# Patient Record
(demographics unavailable — no encounter records)

---

## 2024-11-29 NOTE — HISTORY OF PRESENT ILLNESS
[FreeTextEntry1] : I have had the opportunity to see your patient, GEE GANN, in follow up.   Identification: 16 year girl   Diagnosis(es): Marfan syndrome. Dural ectasia. Chronic migraine.  Interval history: She has been doing much better in terms of control of migraines on galcanezumab. She has been "out" for 2 months. HA's were occurring monthly but now occur about every 2 weeks. Rimegapant is an effective abortive agent. Triggers include reclining in a prone position. No other postural component is noted. No school absenteeism. No academic concerns. Depressed mood and excessive anxiety were denied.

## 2024-11-29 NOTE — PHYSICAL EXAM
[Well-appearing] : well-appearing [Normocephalic] : normocephalic [No ocular abnormalities] : no ocular abnormalities [No abnormal neurocutaneous stigmata or skin lesions] : no abnormal neurocutaneous stigmata or skin lesions [Straight] : straight [No deformities] : no deformities [Alert] : alert [Normal speech and language] : normal speech and language [VFF] : VFF [Pupils reactive to light and accommodation] : pupils reactive to light and accommodation [Full extraocular movements] : full extraocular movements [No nystagmus] : no nystagmus [No papilledema] : no papilledema [No facial asymmetry or weakness] : no facial asymmetry or weakness [Gross hearing intact] : gross hearing intact [Equal palate elevation] : equal palate elevation [Good shoulder shrug] : good shoulder shrug [Normal tongue movement] : normal tongue movement [Normal axial and appendicular muscle tone] : normal axial and appendicular muscle tone [Gets up on table without difficulty] : gets up on table without difficulty [No pronator drift] : no pronator drift [Normal finger tapping and fine finger movements] : normal finger tapping and fine finger movements [No abnormal involuntary movements] : no abnormal involuntary movements [5/5 strength in proximal and distal muscles of arms and legs] : 5/5 strength in proximal and distal muscles of arms and legs [2+ biceps] : 2+ biceps [Triceps] : triceps [Knee jerks] : knee jerks [Ankle jerks] : ankle jerks [No ankle clonus] : no ankle clonus [Localizes LT and temperature] : localizes LT and temperature [de-identified] : respirations appear regular and unlabored  [de-identified] : abdomen does not appear distended  [de-identified] : Mild ptosis noted bilaterally. No fatigability.  [de-identified] : No fatigability [de-identified] : casual gait was narrow based. Heel and toe walking were intact. Tandem gait was intact.  [de-identified] : finger to nose and heel-knee-shin movements were intact. Fast finger movements were brisk, rhythmic and symmetric.

## 2024-11-29 NOTE — ASSESSMENT
[FreeTextEntry1] : Improved HA control on current treatment. It is important to continue galcanezumab.

## 2025-04-03 NOTE — PHYSICAL EXAM
[General Appearance - Alert] : alert [General Appearance - In No Acute Distress] : in no acute distress [General Appearance - Well Nourished] : well nourished [General Appearance - Well Developed] : well developed [General Appearance - Well-Appearing] : well appearing [Marfan Syndrome] : Marfan Syndrome [Sclera] : the conjunctiva were normal [Outer Ear] : the ears and nose were normal in appearance [Examination Of The Oral Cavity] : mucous membranes were moist and pink [Respiration, Rhythm And Depth] : normal respiratory rhythm and effort [Auscultation Breath Sounds / Voice Sounds] : breath sounds clear to auscultation bilaterally [No Cough] : no cough [Chest Palpation Tender Sternum] : no chest wall tenderness [Heart Rate And Rhythm] : normal heart rate and rhythm [Heart Sounds] : normal S1 and S2 [No Murmur] : no murmurs  [Heart Sounds Gallop] : no gallops [Heart Sounds Pericardial Friction Rub] : no pericardial rub [Arterial Pulses] : normal upper and lower extremity pulses with no pulse delay [Edema] : no edema [Capillary Refill Test] : normal capillary refill [Midsystolic] : midsystolic [Apical] : was heard at the apex [No Diastolic Murmur] : no diastolic murmur was heard [Abdomen Soft] : soft [Nail Clubbing] : no clubbing  or cyanosis of the fingernails [Moderate] : moderate [Severe] : severe [Delayed Developmental Milestones] : normal neurologic development for age [Abnormal Walk] : normal gait [] : no rash [Demonstrated Behavior - Infant Nonreactive To Parents] : interactive [Mood] : mood and affect were appropriate for age [Demonstrated Behavior] : normal behavior [FreeTextEntry2] : + Striae + Mitral valve prolapse s/p surgical repair of a pectus excavatum [FreeTextEntry1] : striae on posterior thorax

## 2025-04-03 NOTE — CONSULT LETTER
[Today's Date] : [unfilled] [Name] : Name: [unfilled] [] : : ~~ [Today's Date:] : [unfilled] [Dear  ___:] : Dear Dr. [unfilled]: [Consult] : I had the pleasure of evaluating your patient, [unfilled]. My full evaluation follows. [Consult - Single Provider] : Thank you very much for allowing me to participate in the care of this patient. If you have any questions, please do not hesitate to contact me. [Sincerely,] : Sincerely, [FreeTextEntry4] : Jah Fairbanks MD [FreeTextEntry5] : [address] [FreeTextEntry6] : [place] [address] [de-identified] : Shaina Sellers MD\par  Pediatric Cardiologist\par  [facility], [facility]\par  [address] [place] [address]\par  [place], [place] [address]\par  Phone: [phone]\par  FAX: [phone]\par

## 2025-04-03 NOTE — DISCUSSION/SUMMARY
[PE + No Varsity Sports or Strenuous Activity] : [unfilled] may participate in the physical education program, WITH RESTRICTION from all varsity sports and from excessively stressful activities such as rope climbing, weight lifting, sustained running (i.e. laps) and fitness testing. Must be allowed to rest when tired. [Influenza vaccine is recommended] : Influenza vaccine is recommended [Needs SBE Prophylaxis] : [unfilled] does not need bacterial endocarditis prophylaxis [FreeTextEntry1] : In summary, Mounika's aortic root dimension z-score has improved over the past 6 years.  Her aortic root dimension today is 3.63 cm, z-score = 3.0 (previous z-score in June of 2018 = 4.0) - and remains consistent with mild dilation. Her sinotubular junction remains dilated/effaced. She has mitral and tricuspid valve prolapse; the mitral valve appears to be working quite well with only trivial to mild insufficiency by echocardiogram. There is mild tricuspid regurgitation. There is no aortic insufficiency.  She has a normal LV ejection fraction of 60% (echocardiogram), and the left ventricular volumes by the 5/6*A*L method were within normal limits. She was normotensive on today's evaluation.  Mounika last cardiac MRI/MRA on June 28, 2023. The aortic root dimension was stable at 3.5 cm. No other aortic aneurysms were identified. These measurements correlated well with the echocardiographic measurements. I have recommended that Mounika have a follow-up cardiac MRI/MRA in the late summer 2025. . The purpose of this study would be to obtain accurate imaging of the entire aortic tree, including the ascending, thoracic and abdominal aorta, to rule out aneurysms in sites other than the aortic root. It will, of course, also provide excellent imaging of the aortic root, and therefore accurate dimensions, which can be used to correlate with measurements obtained by echocardiogram. Cardiac MRI/MRA provides excellent supplemental imaging to echocardiograms in surveilling patients with Marfan syndrome.  Mounika's EKG today revealed a normal sinus rhythm. Patients with connective tissue disorders and mitral valve prolapse may develop concerning arrhythmias.  Her most recent Holter monitor performed in May 2022 revealed no concerning arrhythmias.  She had a predominant normal sinus rhythm, with normal heart rate variability, and occasional to frequent premature atrial contractions with no sustained tachyarrhythmias.  Today, she reports no palpitations or irregular heartbeats.  A 24-hour ZIO monitor was placed today and is currently pending.  She is currently tolerating her dose of losartan 50 mg twice daily. The maximum recommended dose of Losartan is 100 mg/day.   I remain very pleased with Mounika's overall improved nutrition.  Time was spent discussing with Mounika and her mother, discussing the importance of adequate nutrition and hydration throughout the course of the day. I have also emphasized the importance of excellent dental hygiene with biannual visits to the dentist for prophylactic cleanings. She should continue to be followed in ophthalmology annually, General Surgery (pectus), orthopedics (joint laxity, foot deformities, subluxation of her knee, scoliosis), neurology (chronic migraine headaches, persistent dizziness), psychiatry for her depression/anxiety, and GI for her constipation and abdominal pain.  We discussed at length, exercise recommendations for Mounika.  Mounika may participate in aerobic activities such as jogging, bike riding, running track, swimming.  She should avoid excessive isometric exercises; however, lifting low weights with repetitions for muscle toning would be permissible.  She is cleared to participate on the school track team, if she would like.  She should be seen in pediatric cardiology followup in approximately 6 months time, or sooner if clinically indicated. I hope you find this information helpful to you.   **An activity recommendation form was provided to Mounika and her mother today.   Total time spent today included reviewing diagnosis and treatment plan/monitoring, review of prior notes, review of medications and monitoring for side effects, review of last labs with patient/family, plan for continued symptom and laboratory monitoring as ordered, and time spent with patient/parent. Reviewed recent chart notes from other providers and medical records as well. This excludes time spent on procedures.

## 2025-04-03 NOTE — CARDIOLOGY SUMMARY
[LVSF ___%] : LV Shortening Fraction [unfilled]% [Today's Date] : [unfilled] [FreeTextEntry1] : An electrocardiogram today shows a normal sinus rhythm at a rate of 75 bpm.  There was a rightward axis and a RV conduction delay. The measured intervals were normal. There was no ectopy seen on the surface electrocardiogram.  The ST-T wave segments were normal.  [FreeTextEntry2] : Summary: 1. Marfan syndrome. 2. Severe pectus excavatum - s/p surgical repair; surgical bars have been removed. 3. Mild tricuspid valve prolapse. 4. Myxomatous mitral valve, mild mitral valve prolapse and accessory mitral valve tissue is noted. 5. Trivial mitral valve regurgitation. 6. Mildly dilated aortic root. 7. No evidence of aortic valve regurgitation. 8. Aortic sinuses of Valsalva dimension (systole) = 3.63 cm (z = 2.98). 9. There is mildly asymmetric aortic root, the posterior, non-coronary cusp being more prominent. The aortic root in short axis (PSAX) in systole measures (3.43 X 3.47 X 3.45 cm) in dimension. The ascending aorta in cross section (PSAX) at the level of the right pulmonary artery measures: 2.5 cm. The sino-tubular junction is effaced. The thoracic descending and proximal abdominal aorta appear normal in contour and caliber. 10. Normal ascending aorta. 11. Ascending aorta dimension (systole) = 2.50 cm (z = 0.02). 12. No evidence of pulmonary hypertension. 13. Pulmonary artery pressure estimate is based on interventricular septal systolic configuration. 14. Normal left ventricular size, morphology and systolic function. 15. Left ventricular ejection fraction by 5/6 Area x Length is normal at 60 %. 16. The LV volumes by the 5/6*A*L method were within normal limits. 17. Normal left ventricular diastolic function. 18. Normal right ventricular morphology with qualitatively normal size and systolic function. 19. No pericardial effusion. [de-identified] : ZIO - 24 hours - pending  May 16, 2022: This 24 hour Holter monitor revealed a NSR at a rate of 46 - 171 bpm, with an average heart rate of 86 bpm.  Frequent isolated premature atrial contractions were noted. Rare isolated premature ventricular contractions were seen.  July 19, 2021: This 24 hour Holter monitor revealed a NSR at a rate of 42 - 183 bpm, with an average heart rate of 78 bpm.  Occasional isolated premature atrial contractions were noted. Rare isolated premature ventricular contractions were seen.  6/13/2018: This 24 hour Holter monitor revealed a NSR at a rate of 42 - 156 bpm, with an average heart rate of 70 bpm. Rare premature atrial contractions were noted. Rare late cycle, unifocal premature ventricular contractions were seen. [de-identified] : Order: Cardiac MRI/MRA for late summer of 2025  June 28, 2023: The systolic aortic root dimension (LV outflow) measured 3.56 cm. The aortic dimension in cross section was 3.45 cm x 3.54 cm x 3.55 cm. Taking the maximal dimension of 3.55 cm, the z-score was 3.24.  The ascending aorta measured 2.3 cm in cross section, Z score of -0.36.  The ascending, transverse and descending aorta appeared normal with no evidence of aneurysmal dilation. Mitral and tricuspid valve prolapse with trivial mitral and tricuspid regurgitation was noted.  The right pulmonary artery has a longer course than the left pulmonary artery.  There was no evidence of branch pulmonary artery stenosis.  The left ventricular ejection fraction was low normal at 58.5%.   August 29, 2019: This study was notable for a severe pectus excavatum and scoliosis. The systolic aortic root dimension (LV outflow) measured 2.97 cm. The aortic dimension in cross section was 2.75 cm x 2.84 cm x 3.02 cm. Taking the maximal dimension of 3.02 cm, the z-score was 3.81. The ascending, transverse and descending aorta appeared normal with no evidence of aneurysmal dilation. Mitral and tricuspid valve prolapse with trivial mitral and tricuspid regurgitation was noted. Severe pectus excavatum, especially inferiorly, resulting in the complete shift of the heart into the left thorax. As a result, the right pulmonary artery is stretched with a longer course. The left pulmonary artery has a short course. There is no branch pulmonary artery stenosis. The left ventricular ejection fraction was low normal at 54%.   August 13, 2015: This study was notable for a pectus excavatum and scoliosis. The systolic aortic root dimension (LV outflow) measured 2.35 cm. The aortic dimension in cross section was 2.54 cm x 2.4 cm x 2.43 cm. Taking the maximal dimension of 2.54 cm, the z-score was 2.85. The ascending, transverse and descending aorta appeared normal with no evidence of aneurysmal dilation. Mitral valve prolapse with trivial mitral regurgitation was noted. The pulmonary arteries were normal in size. The course of the right and left coronary arteries, as well as their origins, was normal. The left ventricular ejection fraction was normal at 59%. 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 Pathogenic mutation for Marfan Syndrome.

## 2025-04-03 NOTE — CARDIOLOGY SUMMARY
[LVSF ___%] : LV Shortening Fraction [unfilled]% [Today's Date] : [unfilled] [FreeTextEntry1] : An electrocardiogram today shows a normal sinus rhythm at a rate of 75 bpm.  There was a rightward axis and a RV conduction delay. The measured intervals were normal. There was no ectopy seen on the surface electrocardiogram.  The ST-T wave segments were normal.  [FreeTextEntry2] : Summary: 1. Marfan syndrome. 2. Severe pectus excavatum - s/p surgical repair; surgical bars have been removed. 3. Mild tricuspid valve prolapse. 4. Myxomatous mitral valve, mild mitral valve prolapse and accessory mitral valve tissue is noted. 5. Trivial mitral valve regurgitation. 6. Mildly dilated aortic root. 7. No evidence of aortic valve regurgitation. 8. Aortic sinuses of Valsalva dimension (systole) = 3.63 cm (z = 2.98). 9. There is mildly asymmetric aortic root, the posterior, non-coronary cusp being more prominent. The aortic root in short axis (PSAX) in systole measures (3.43 X 3.47 X 3.45 cm) in dimension. The ascending aorta in cross section (PSAX) at the level of the right pulmonary artery measures: 2.5 cm. The sino-tubular junction is effaced. The thoracic descending and proximal abdominal aorta appear normal in contour and caliber. 10. Normal ascending aorta. 11. Ascending aorta dimension (systole) = 2.50 cm (z = 0.02). 12. No evidence of pulmonary hypertension. 13. Pulmonary artery pressure estimate is based on interventricular septal systolic configuration. 14. Normal left ventricular size, morphology and systolic function. 15. Left ventricular ejection fraction by 5/6 Area x Length is normal at 60 %. 16. The LV volumes by the 5/6*A*L method were within normal limits. 17. Normal left ventricular diastolic function. 18. Normal right ventricular morphology with qualitatively normal size and systolic function. 19. No pericardial effusion. [de-identified] : ZIO - 24 hours - pending  May 16, 2022: This 24 hour Holter monitor revealed a NSR at a rate of 46 - 171 bpm, with an average heart rate of 86 bpm.  Frequent isolated premature atrial contractions were noted. Rare isolated premature ventricular contractions were seen.  July 19, 2021: This 24 hour Holter monitor revealed a NSR at a rate of 42 - 183 bpm, with an average heart rate of 78 bpm.  Occasional isolated premature atrial contractions were noted. Rare isolated premature ventricular contractions were seen.  6/13/2018: This 24 hour Holter monitor revealed a NSR at a rate of 42 - 156 bpm, with an average heart rate of 70 bpm. Rare premature atrial contractions were noted. Rare late cycle, unifocal premature ventricular contractions were seen. [de-identified] : Order: Cardiac MRI/MRA for late summer of 2025  June 28, 2023: The systolic aortic root dimension (LV outflow) measured 3.56 cm. The aortic dimension in cross section was 3.45 cm x 3.54 cm x 3.55 cm. Taking the maximal dimension of 3.55 cm, the z-score was 3.24.  The ascending aorta measured 2.3 cm in cross section, Z score of -0.36.  The ascending, transverse and descending aorta appeared normal with no evidence of aneurysmal dilation. Mitral and tricuspid valve prolapse with trivial mitral and tricuspid regurgitation was noted.  The right pulmonary artery has a longer course than the left pulmonary artery.  There was no evidence of branch pulmonary artery stenosis.  The left ventricular ejection fraction was low normal at 58.5%.   August 29, 2019: This study was notable for a severe pectus excavatum and scoliosis. The systolic aortic root dimension (LV outflow) measured 2.97 cm. The aortic dimension in cross section was 2.75 cm x 2.84 cm x 3.02 cm. Taking the maximal dimension of 3.02 cm, the z-score was 3.81. The ascending, transverse and descending aorta appeared normal with no evidence of aneurysmal dilation. Mitral and tricuspid valve prolapse with trivial mitral and tricuspid regurgitation was noted. Severe pectus excavatum, especially inferiorly, resulting in the complete shift of the heart into the left thorax. As a result, the right pulmonary artery is stretched with a longer course. The left pulmonary artery has a short course. There is no branch pulmonary artery stenosis. The left ventricular ejection fraction was low normal at 54%.   August 13, 2015: This study was notable for a pectus excavatum and scoliosis. The systolic aortic root dimension (LV outflow) measured 2.35 cm. The aortic dimension in cross section was 2.54 cm x 2.4 cm x 2.43 cm. Taking the maximal dimension of 2.54 cm, the z-score was 2.85. The ascending, transverse and descending aorta appeared normal with no evidence of aneurysmal dilation. Mitral valve prolapse with trivial mitral regurgitation was noted. The pulmonary arteries were normal in size. The course of the right and left coronary arteries, as well as their origins, was normal. The left ventricular ejection fraction was normal at 59%. 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 Pathogenic mutation for Marfan Syndrome.

## 2025-04-03 NOTE — CARDIOLOGY SUMMARY
[LVSF ___%] : LV Shortening Fraction [unfilled]% [Today's Date] : [unfilled] [FreeTextEntry1] : An electrocardiogram today shows a normal sinus rhythm at a rate of 75 bpm.  There was a rightward axis and a RV conduction delay. The measured intervals were normal. There was no ectopy seen on the surface electrocardiogram.  The ST-T wave segments were normal.  [FreeTextEntry2] : Summary: 1. Marfan syndrome. 2. Severe pectus excavatum - s/p surgical repair; surgical bars have been removed. 3. Mild tricuspid valve prolapse. 4. Myxomatous mitral valve, mild mitral valve prolapse and accessory mitral valve tissue is noted. 5. Trivial mitral valve regurgitation. 6. Mildly dilated aortic root. 7. No evidence of aortic valve regurgitation. 8. Aortic sinuses of Valsalva dimension (systole) = 3.63 cm (z = 2.98). 9. There is mildly asymmetric aortic root, the posterior, non-coronary cusp being more prominent. The aortic root in short axis (PSAX) in systole measures (3.43 X 3.47 X 3.45 cm) in dimension. The ascending aorta in cross section (PSAX) at the level of the right pulmonary artery measures: 2.5 cm. The sino-tubular junction is effaced. The thoracic descending and proximal abdominal aorta appear normal in contour and caliber. 10. Normal ascending aorta. 11. Ascending aorta dimension (systole) = 2.50 cm (z = 0.02). 12. No evidence of pulmonary hypertension. 13. Pulmonary artery pressure estimate is based on interventricular septal systolic configuration. 14. Normal left ventricular size, morphology and systolic function. 15. Left ventricular ejection fraction by 5/6 Area x Length is normal at 60 %. 16. The LV volumes by the 5/6*A*L method were within normal limits. 17. Normal left ventricular diastolic function. 18. Normal right ventricular morphology with qualitatively normal size and systolic function. 19. No pericardial effusion. [de-identified] : ZIO - 24 hours - pending  May 16, 2022: This 24 hour Holter monitor revealed a NSR at a rate of 46 - 171 bpm, with an average heart rate of 86 bpm.  Frequent isolated premature atrial contractions were noted. Rare isolated premature ventricular contractions were seen.  July 19, 2021: This 24 hour Holter monitor revealed a NSR at a rate of 42 - 183 bpm, with an average heart rate of 78 bpm.  Occasional isolated premature atrial contractions were noted. Rare isolated premature ventricular contractions were seen.  6/13/2018: This 24 hour Holter monitor revealed a NSR at a rate of 42 - 156 bpm, with an average heart rate of 70 bpm. Rare premature atrial contractions were noted. Rare late cycle, unifocal premature ventricular contractions were seen. [de-identified] : Order: Cardiac MRI/MRA for late summer of 2025  June 28, 2023: The systolic aortic root dimension (LV outflow) measured 3.56 cm. The aortic dimension in cross section was 3.45 cm x 3.54 cm x 3.55 cm. Taking the maximal dimension of 3.55 cm, the z-score was 3.24.  The ascending aorta measured 2.3 cm in cross section, Z score of -0.36.  The ascending, transverse and descending aorta appeared normal with no evidence of aneurysmal dilation. Mitral and tricuspid valve prolapse with trivial mitral and tricuspid regurgitation was noted.  The right pulmonary artery has a longer course than the left pulmonary artery.  There was no evidence of branch pulmonary artery stenosis.  The left ventricular ejection fraction was low normal at 58.5%.   August 29, 2019: This study was notable for a severe pectus excavatum and scoliosis. The systolic aortic root dimension (LV outflow) measured 2.97 cm. The aortic dimension in cross section was 2.75 cm x 2.84 cm x 3.02 cm. Taking the maximal dimension of 3.02 cm, the z-score was 3.81. The ascending, transverse and descending aorta appeared normal with no evidence of aneurysmal dilation. Mitral and tricuspid valve prolapse with trivial mitral and tricuspid regurgitation was noted. Severe pectus excavatum, especially inferiorly, resulting in the complete shift of the heart into the left thorax. As a result, the right pulmonary artery is stretched with a longer course. The left pulmonary artery has a short course. There is no branch pulmonary artery stenosis. The left ventricular ejection fraction was low normal at 54%.   August 13, 2015: This study was notable for a pectus excavatum and scoliosis. The systolic aortic root dimension (LV outflow) measured 2.35 cm. The aortic dimension in cross section was 2.54 cm x 2.4 cm x 2.43 cm. Taking the maximal dimension of 2.54 cm, the z-score was 2.85. The ascending, transverse and descending aorta appeared normal with no evidence of aneurysmal dilation. Mitral valve prolapse with trivial mitral regurgitation was noted. The pulmonary arteries were normal in size. The course of the right and left coronary arteries, as well as their origins, was normal. The left ventricular ejection fraction was normal at 59%. 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 Pathogenic mutation for Marfan Syndrome.

## 2025-04-03 NOTE — CARDIOLOGY SUMMARY
[LVSF ___%] : LV Shortening Fraction [unfilled]% [Today's Date] : [unfilled] [FreeTextEntry1] : An electrocardiogram today shows a normal sinus rhythm at a rate of 75 bpm.  There was a rightward axis and a RV conduction delay. The measured intervals were normal. There was no ectopy seen on the surface electrocardiogram.  The ST-T wave segments were normal.  [FreeTextEntry2] : Summary: 1. Marfan syndrome. 2. Severe pectus excavatum - s/p surgical repair; surgical bars have been removed. 3. Mild tricuspid valve prolapse. 4. Myxomatous mitral valve, mild mitral valve prolapse and accessory mitral valve tissue is noted. 5. Trivial mitral valve regurgitation. 6. Mildly dilated aortic root. 7. No evidence of aortic valve regurgitation. 8. Aortic sinuses of Valsalva dimension (systole) = 3.63 cm (z = 2.98). 9. There is mildly asymmetric aortic root, the posterior, non-coronary cusp being more prominent. The aortic root in short axis (PSAX) in systole measures (3.43 X 3.47 X 3.45 cm) in dimension. The ascending aorta in cross section (PSAX) at the level of the right pulmonary artery measures: 2.5 cm. The sino-tubular junction is effaced. The thoracic descending and proximal abdominal aorta appear normal in contour and caliber. 10. Normal ascending aorta. 11. Ascending aorta dimension (systole) = 2.50 cm (z = 0.02). 12. No evidence of pulmonary hypertension. 13. Pulmonary artery pressure estimate is based on interventricular septal systolic configuration. 14. Normal left ventricular size, morphology and systolic function. 15. Left ventricular ejection fraction by 5/6 Area x Length is normal at 60 %. 16. The LV volumes by the 5/6*A*L method were within normal limits. 17. Normal left ventricular diastolic function. 18. Normal right ventricular morphology with qualitatively normal size and systolic function. 19. No pericardial effusion. [de-identified] : ZIO - 24 hours - pending  May 16, 2022: This 24 hour Holter monitor revealed a NSR at a rate of 46 - 171 bpm, with an average heart rate of 86 bpm.  Frequent isolated premature atrial contractions were noted. Rare isolated premature ventricular contractions were seen.  July 19, 2021: This 24 hour Holter monitor revealed a NSR at a rate of 42 - 183 bpm, with an average heart rate of 78 bpm.  Occasional isolated premature atrial contractions were noted. Rare isolated premature ventricular contractions were seen.  6/13/2018: This 24 hour Holter monitor revealed a NSR at a rate of 42 - 156 bpm, with an average heart rate of 70 bpm. Rare premature atrial contractions were noted. Rare late cycle, unifocal premature ventricular contractions were seen. [de-identified] : Order: Cardiac MRI/MRA for late summer of 2025  June 28, 2023: The systolic aortic root dimension (LV outflow) measured 3.56 cm. The aortic dimension in cross section was 3.45 cm x 3.54 cm x 3.55 cm. Taking the maximal dimension of 3.55 cm, the z-score was 3.24.  The ascending aorta measured 2.3 cm in cross section, Z score of -0.36.  The ascending, transverse and descending aorta appeared normal with no evidence of aneurysmal dilation. Mitral and tricuspid valve prolapse with trivial mitral and tricuspid regurgitation was noted.  The right pulmonary artery has a longer course than the left pulmonary artery.  There was no evidence of branch pulmonary artery stenosis.  The left ventricular ejection fraction was low normal at 58.5%.   August 29, 2019: This study was notable for a severe pectus excavatum and scoliosis. The systolic aortic root dimension (LV outflow) measured 2.97 cm. The aortic dimension in cross section was 2.75 cm x 2.84 cm x 3.02 cm. Taking the maximal dimension of 3.02 cm, the z-score was 3.81. The ascending, transverse and descending aorta appeared normal with no evidence of aneurysmal dilation. Mitral and tricuspid valve prolapse with trivial mitral and tricuspid regurgitation was noted. Severe pectus excavatum, especially inferiorly, resulting in the complete shift of the heart into the left thorax. As a result, the right pulmonary artery is stretched with a longer course. The left pulmonary artery has a short course. There is no branch pulmonary artery stenosis. The left ventricular ejection fraction was low normal at 54%.   August 13, 2015: This study was notable for a pectus excavatum and scoliosis. The systolic aortic root dimension (LV outflow) measured 2.35 cm. The aortic dimension in cross section was 2.54 cm x 2.4 cm x 2.43 cm. Taking the maximal dimension of 2.54 cm, the z-score was 2.85. The ascending, transverse and descending aorta appeared normal with no evidence of aneurysmal dilation. Mitral valve prolapse with trivial mitral regurgitation was noted. The pulmonary arteries were normal in size. The course of the right and left coronary arteries, as well as their origins, was normal. The left ventricular ejection fraction was normal at 59%. 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 Pathogenic mutation for Marfan Syndrome.

## 2025-04-03 NOTE — HISTORY OF PRESENT ILLNESS
[FreeTextEntry1] : Mounika was evaluated at the cardiology office at the [facility] on April 1, 2025.  She is now a 16 year-10-month-old young lady who is followed in our division with the diagnosis of Marfan syndrome (genetically confirmed), with a dilated aortic root, and mitral valve prolapse. Her last cardiac evaluation was on July 9, 2024. She had a successful surgical repair of her pectus excavatum by Dr. Rincon on December 13, 2019, as well as successful surgical removal of the chest wall bar on August 10, 2022.  She was accompanied to the office visit today by her mother.   Mounika's father has Marfan's syndrome, with a known molecular genetically determined familial mutation for this syndrome, (see below for the mutation). Mounika shares this same genetic familial mutation with her father.   Mounika is asymptomatic with reference to the cardiovascular system.  She reports no chest pain, palpitations, dizziness, easy fatigability, shortness of breath, or syncope. Mounika has been staying active by walking.  In the past, she has also enjoyed participating on the [employer] team.  She participates in a modified gym program at school.  Her last cardiac MRI/MRA was on June 28, 2023.  She is being treated with losartan 50 mg twice daily, for her dilated aorta, and tolerates this medication without any problems.   In early May, 2022,  Mounika had some increased anxiety.  She was going to the school nurse for migraines and chest wall discomfort.  At those times, the nurse recorded a heart rate of 120 bpm.  For these reasons, a 24-hour Holter monitor was performed on May 16, 2022.  This study was a predominant normal sinus rhythm with normal heart rate variability and occasional to frequent premature atrial contractions.  Since that time, Mounika had been working with a psychiatrist and psychologist.  These interventions appeared to be helpful with regard to addressing Mounika's anxieties/depression. She is being treated with Luvox/ fluvoxamine for depression (selective serotonin reuptake inhibitor). Mounika is no longer complaining of palpitations or chest pain.  Mounika is now on a 5-week wean of Luvox (Fluvoxamine) and has been started on Wellbutrin (Bupropion).  She appears well-nourished and her BMI is quite good at the 55%ile (improved from the 5%ile in July of 2020).  In the past, Mounika was evaluated by Dr. Neil in the adolescent medicine eating disorder clinic.  The family has opted not to return to their care.  However, Mounika has shown marked improvement in her eating habits and continues to gain weight progressively and appropriately.  She was last seen in GI by Dr. Nolan in September of 2023.  Mounika has a history of significant headaches.  Mounika is followed by Dr. Borjas, pediatric neurologist, for her headaches. 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 In September 2019, Mounika had a brain MRI performed which was unremarkable. An MRA of her neck was notable for tortuosities of the internal carotid arteries with no aneurysms, or stenoses. The possibility of a "low pressure CSF" headache was considered and Florinef was started. This did not seem to affect the intensity or frequency of the headaches. Her neurologist discontinued therapy with Florinef. After multiple headache medications had failed, Mounika was started on therapy with gabapentin by Dr. Borjas.   Also, Depakote was added to her treatment for her migraine headaches in September 2020.   In the past, she had a short trial of atenolol 12.5 mg once daily for approximately 1 week. However, Mounika complained of dizziness, lightheadedness and visual changes while taking the atenolol.  These medications have been discontinued.  She is currently being treated with Emgality - Galcanezumab (an injection once a month for her migraines), which has been helpful.  She continues to get recurrent migraines and is treated with Nurtec (rimegepant) for break-through headaches. She is followed by Dr. Borjas, in Ped Neuro. Her last evaluation was on November 26, 2024.  While in school, her headaches are often triggered by bright light and screens such as a "smart board".  Mounika now has 504 accommodations in school, such that Mounika gets her learning information on her personal Ipad instead of from the smart board. Mrs. Fallon says that this also has helped decrease her headaches.   I continued to emphasize the importance of excellent hydration throughout the course of the day, as well as salty snacks, as well as adequate sleep hygiene and daily exercise.  Her pectus excavatum was extremely severe, (Ita index of 9.4). She is s/p surgical repair of her pectus excavatum by Dr. Rincon on December 13, 2019, as well as successful surgical removal of the chest wall bar on August 10, 2022.   In the past, she had been followed in orthopedics at [facility] by Dr. Cerda for her scoliosis. In April of 2018, Mounika had a subluxation of her patella, which was followed in pediatric orthopedics by Dr. Escobedo. Mounika was evaluated [place]. Ganrica of [place] [facility] [place].  She has scoliosis (28 degrees), and postural kyphosis.  She is nearing skeletal maturity, and her scoliosis is unlikely to progress significantly. She is in need of orthopedic follow-up.   She is in the 11th grade.  She participates in the [employer] Bac[employer]ate program in her high school, an accelerated honors program.  She has a 504 in place in school.  She is s/p a palate expander and orthodontic braces. She has no known allergies to medications. Her immunizations are up to date.   Her last ophthalmology visit was in the summer of 2023. She has glasses and contact lenses for myopia (-2.75 diopters OD, -3.5 diopters OS).  She has no evidence of ectopia lentis.  Annual follow-up in ophthalmology is recommended.  Her last dental evaluation was in May of 2024.  A review of systems was otherwise unremarkable.  Mounika's father has many medical problems relating to his Marfan syndrome. In December of 2012, Mr. Fallon underwent a valve sparing aortic root replacement with a Valsalva graft, by Dr. Gerardo, at [facility]. He is followed by cardiology at [facility] for his cardiac issues relating to Marfan syndrome.  Recently, in May 2023, Mr. Fallon developed atrial fibrillation requiring an ablation which was performed at [facility].  He also has severe scoliosis (90 degree curve). He had spine surgery in July of 2013 at [facility] for scoliosis. Mr. Fallon was also treated for testicular cancer in the summer of 2014. Mounika's younger brother has been diagnosed with Crohn's disease at [facility].  There has been no other interval family history.

## 2025-04-03 NOTE — CONSULT LETTER
[Today's Date] : [unfilled] [Name] : Name: [unfilled] [] : : ~~ [Today's Date:] : [unfilled] [Dear  ___:] : Dear Dr. [unfilled]: [Consult] : I had the pleasure of evaluating your patient, [unfilled]. My full evaluation follows. [Consult - Single Provider] : Thank you very much for allowing me to participate in the care of this patient. If you have any questions, please do not hesitate to contact me. [Sincerely,] : Sincerely, [FreeTextEntry4] : Jah Fairbanks MD [FreeTextEntry5] : [address] [FreeTextEntry6] : [place] [address] [de-identified] : Shaina Sellers MD\par  Pediatric Cardiologist\par  [facility], [facility]\par  [address] [place] [address]\par  [place], [place] [address]\par  Phone: [phone]\par  FAX: [phone]\par

## 2025-04-03 NOTE — HISTORY OF PRESENT ILLNESS
[FreeTextEntry1] : Mounika was evaluated at the cardiology office at the [facility] on April 1, 2025.  She is now a 16 year-10-month-old young lady who is followed in our division with the diagnosis of Marfan syndrome (genetically confirmed), with a dilated aortic root, and mitral valve prolapse. Her last cardiac evaluation was on July 9, 2024. She had a successful surgical repair of her pectus excavatum by Dr. Rincon on December 13, 2019, as well as successful surgical removal of the chest wall bar on August 10, 2022.  She was accompanied to the office visit today by her mother.   Mounika's father has Marfan's syndrome, with a known molecular genetically determined familial mutation for this syndrome, (see below for the mutation). Mounika shares this same genetic familial mutation with her father.   Mounika is asymptomatic with reference to the cardiovascular system.  She reports no chest pain, palpitations, dizziness, easy fatigability, shortness of breath, or syncope. Mounika has been staying active by walking.  In the past, she has also enjoyed participating on the [employer] team.  She participates in a modified gym program at school.  Her last cardiac MRI/MRA was on June 28, 2023.  She is being treated with losartan 50 mg twice daily, for her dilated aorta, and tolerates this medication without any problems.   In early May, 2022,  Mounika had some increased anxiety.  She was going to the school nurse for migraines and chest wall discomfort.  At those times, the nurse recorded a heart rate of 120 bpm.  For these reasons, a 24-hour Holter monitor was performed on May 16, 2022.  This study was a predominant normal sinus rhythm with normal heart rate variability and occasional to frequent premature atrial contractions.  Since that time, Mounika had been working with a psychiatrist and psychologist.  These interventions appeared to be helpful with regard to addressing Mounika's anxieties/depression. She is being treated with Luvox/ fluvoxamine for depression (selective serotonin reuptake inhibitor). Mounika is no longer complaining of palpitations or chest pain.  Mounika is now on a 5-week wean of Luvox (Fluvoxamine) and has been started on Wellbutrin (Bupropion).  She appears well-nourished and her BMI is quite good at the 55%ile (improved from the 5%ile in July of 2020).  In the past, Mounika was evaluated by Dr. Neil in the adolescent medicine eating disorder clinic.  The family has opted not to return to their care.  However, Mounika has shown marked improvement in her eating habits and continues to gain weight progressively and appropriately.  She was last seen in GI by Dr. Nolan in September of 2023.  Mounika has a history of significant headaches.  Mounika is followed by Dr. Borjas, pediatric neurologist, for her headaches. 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 In September 2019, Mounika had a brain MRI performed which was unremarkable. An MRA of her neck was notable for tortuosities of the internal carotid arteries with no aneurysms, or stenoses. The possibility of a "low pressure CSF" headache was considered and Florinef was started. This did not seem to affect the intensity or frequency of the headaches. Her neurologist discontinued therapy with Florinef. After multiple headache medications had failed, Mounika was started on therapy with gabapentin by Dr. Borjas.   Also, Depakote was added to her treatment for her migraine headaches in September 2020.   In the past, she had a short trial of atenolol 12.5 mg once daily for approximately 1 week. However, Mounika complained of dizziness, lightheadedness and visual changes while taking the atenolol.  These medications have been discontinued.  She is currently being treated with Emgality - Galcanezumab (an injection once a month for her migraines), which has been helpful.  She continues to get recurrent migraines and is treated with Nurtec (rimegepant) for break-through headaches. She is followed by Dr. Borjas, in Ped Neuro. Her last evaluation was on November 26, 2024.  While in school, her headaches are often triggered by bright light and screens such as a "smart board".  Mounika now has 504 accommodations in school, such that Mounika gets her learning information on her personal Ipad instead of from the smart board. Mrs. Fallon says that this also has helped decrease her headaches.   I continued to emphasize the importance of excellent hydration throughout the course of the day, as well as salty snacks, as well as adequate sleep hygiene and daily exercise.  Her pectus excavatum was extremely severe, (Ita index of 9.4). She is s/p surgical repair of her pectus excavatum by Dr. Rincon on December 13, 2019, as well as successful surgical removal of the chest wall bar on August 10, 2022.   In the past, she had been followed in orthopedics at [facility] by Dr. Cerda for her scoliosis. In April of 2018, Mounika had a subluxation of her patella, which was followed in pediatric orthopedics by Dr. Escobedo. Mounika was evaluated by Dr. Garnica of orthopedics at [facility] in November 2022.  She has scoliosis (28 degrees), and postural kyphosis.  She is nearing skeletal maturity, and her scoliosis is unlikely to progress significantly. She is in need of orthopedic follow-up.   She is in the 11th grade.  She participates in the [employer]ate program in her high school, an accelerated honors program.  She has a 504 in place in school.  She is s/p a palate expander and orthodontic braces. She has no known allergies to medications. Her immunizations are up to date.   Her last ophthalmology visit was in the summer of 2023. She has glasses and contact lenses for myopia (-2.75 diopters OD, -3.5 diopters OS).  She has no evidence of ectopia lentis.  Annual follow-up in ophthalmology is recommended.  Her last dental evaluation was in May of 2024.  A review of systems was otherwise unremarkable.  Mounika's father has many medical problems relating to his Marfan syndrome. In December of 2012, Mr. Fallon underwent a valve sparing aortic root replacement with a Valsalva graft, by Dr. Gerardo, at [facility]. He is followed by cardiology at [facility] for his cardiac issues relating to Marfan syndrome.  Recently, in May 2023, Mr. Fallon developed atrial fibrillation requiring an ablation which was performed at [facility].  He also has severe scoliosis (90 degree curve). He had spine surgery in July of 2013 at [facility] for scoliosis. Mr. Fallon was also treated for testicular cancer in the summer of 2014. Mounika's younger brother has been diagnosed with Crohn's disease at [facility].  There has been no other interval family history.

## 2025-04-03 NOTE — CONSULT LETTER
[Today's Date] : [unfilled] [Name] : Name: [unfilled] [] : : ~~ [Today's Date:] : [unfilled] [Dear  ___:] : Dear Dr. [unfilled]: [Consult] : I had the pleasure of evaluating your patient, [unfilled]. My full evaluation follows. [Consult - Single Provider] : Thank you very much for allowing me to participate in the care of this patient. If you have any questions, please do not hesitate to contact me. [Sincerely,] : Sincerely, [FreeTextEntry4] : Jah Fairbanks MD [FreeTextEntry5] : [address] [FreeTextEntry6] : [place] [address] [de-identified] : Shaina Sellers MD\par  Pediatric Cardiologist\par  [facility]\par  [address] [place] [address]\par  [place], [place] [address]\par  Phone: [phone]\par  FAX: [phone]\par

## 2025-04-03 NOTE — CONSULT LETTER
[Today's Date] : [unfilled] [Name] : Name: [unfilled] [] : : ~~ [Today's Date:] : [unfilled] [Dear  ___:] : Dear Dr. [unfilled]: [Consult] : I had the pleasure of evaluating your patient, [unfilled]. My full evaluation follows. [Consult - Single Provider] : Thank you very much for allowing me to participate in the care of this patient. If you have any questions, please do not hesitate to contact me. [Sincerely,] : Sincerely, [FreeTextEntry4] : Jah Fairbanks MD [FreeTextEntry5] : [address] [FreeTextEntry6] : [place] [address] [de-identified] : Shaina Sellers MD\par  Pediatric Cardiologist\par  [facility]\par  [address] [place] [address]\par  [place], [place] [address]\par  Phone: [phone]\par  FAX: [phone]\par

## 2025-04-03 NOTE — HISTORY OF PRESENT ILLNESS
[FreeTextEntry1] : Mounika was evaluated at the cardiology office at the [facility] on April 1, 2025.  She is now a 16 year-10-month-old young lady who is followed in our division with the diagnosis of Marfan syndrome (genetically confirmed), with a dilated aortic root, and mitral valve prolapse. Her last cardiac evaluation was on July 9, 2024. She had a successful surgical repair of her pectus excavatum by Dr. Rincon on December 13, 2019, as well as successful surgical removal of the chest wall bar on August 10, 2022.  She was accompanied to the office visit today by her mother.   Mounika's father has Marfan's syndrome, with a known molecular genetically determined familial mutation for this syndrome, (see below for the mutation). Mounika shares this same genetic familial mutation with her father.   Mounika is asymptomatic with reference to the cardiovascular system.  She reports no chest pain, palpitations, dizziness, easy fatigability, shortness of breath, or syncope. Mounika has been staying active by walking.  In the past, she has also enjoyed participating on the [employer] team.  She participates in a modified gym program at school.  Her last cardiac MRI/MRA was on June 28, 2023.  She is being treated with losartan 50 mg twice daily, for her dilated aorta, and tolerates this medication without any problems.   In early May, 2022,  Mounika had some increased anxiety.  She was going to the school nurse for migraines and chest wall discomfort.  At those times, the nurse recorded a heart rate of 120 bpm.  For these reasons, a 24-hour Holter monitor was performed on May 16, 2022.  This study was a predominant normal sinus rhythm with normal heart rate variability and occasional to frequent premature atrial contractions.  Since that time, Mounika had been working with a psychiatrist and psychologist.  These interventions appeared to be helpful with regard to addressing Mounika's anxieties/depression. She is being treated with Luvox/ fluvoxamine for depression (selective serotonin reuptake inhibitor). Mounika is no longer complaining of palpitations or chest pain.  Mounika is now on a 5-week wean of Luvox (Fluvoxamine) and has been started on Wellbutrin (Bupropion).  She appears well-nourished and her BMI is quite good at the 55%ile (improved from the 5%ile in July of 2020).  In the past, Mounika was evaluated by Dr. Neil in the adolescent medicine eating disorder clinic.  The family has opted not to return to their care.  However, Mounika has shown marked improvement in her eating habits and continues to gain weight progressively and appropriately.  She was last seen in GI by Dr. Nolan in September of 2023.  Mouinka has a history of significant headaches.  Mounika is followed by Dr. Borjas, pediatric neurologist, for her headaches. 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 In September 2019, Mounika had a brain MRI performed which was unremarkable. An MRA of her neck was notable for tortuosities of the internal carotid arteries with no aneurysms, or stenoses. The possibility of a "low pressure CSF" headache was considered and Florinef was started. This did not seem to affect the intensity or frequency of the headaches. Her neurologist discontinued therapy with Florinef. After multiple headache medications had failed, Mounika was started on therapy with gabapentin by Dr. Borjas.   Also, Depakote was added to her treatment for her migraine headaches in September 2020.   In the past, she had a short trial of atenolol 12.5 mg once daily for approximately 1 week. However, Muonika complained of dizziness, lightheadedness and visual changes while taking the atenolol.  These medications have been discontinued.  She is currently being treated with Emgality - Galcanezumab (an injection once a month for her migraines), which has been helpful.  She continues to get recurrent migraines and is treated with Nurtec (rimegepant) for break-through headaches. She is followed by Dr. Borjas, in Ped Neuro. Her last evaluation was on November 26, 2024.  While in school, her headaches are often triggered by bright light and screens such as a "smart board".  Mounika now has 504 accommodations in school, such that Mounika gets her learning information on her personal Ipad instead of from the smart board. Mrs. Fallon says that this also has helped decrease her headaches.   I continued to emphasize the importance of excellent hydration throughout the course of the day, as well as salty snacks, as well as adequate sleep hygiene and daily exercise.  Her pectus excavatum was extremely severe, (Ita index of 9.4). She is s/p surgical repair of her pectus excavatum by Dr. Rincon on December 13, 2019, as well as successful surgical removal of the chest wall bar on August 10, 2022.   In the past, she had been followed in orthopedics at the [facility] by Dr. Cerda for her scoliosis. In April of 2018, Mounika had a subluxation of her patella, which was followed in pediatric orthopedics by Dr. Escobedo. Mounika was evaluated by Dr. Garnica of orthopedics at [facility] in November 2022.  She has scoliosis (28 degrees), and postural kyphosis.  She is nearing skeletal maturity, and her scoliosis is unlikely to progress significantly. She is in need of orthopedic follow-up.   She is in the 11th grade.  She participates in the [employer]ate program in her high school, an accelerated honors program.  She has a 504 in place in school.  She is s/p a palate expander and orthodontic braces. She has no known allergies to medications. Her immunizations are up to date.   Her last ophthalmology visit was in the summer of 2023. She has glasses and contact lenses for myopia (-2.75 diopters OD, -3.5 diopters OS).  She has no evidence of ectopia lentis.  Annual follow-up in ophthalmology is recommended.  Her last dental evaluation was in May of 2024.  A review of systems was otherwise unremarkable.  Mounika's father has many medical problems relating to his Marfan syndrome. In December of 2012, Mr. Fallon underwent a valve sparing aortic root replacement with a Valsalva graft, by Dr. Gerardo, at [facility]. He is followed by cardiology at [facility] for his cardiac issues relating to Marfan syndrome.  Recently, in May 2023, Mr. Fallon developed atrial fibrillation requiring an ablation which was performed at [facility].  He also has severe scoliosis (90 degree curve). He had spine [facility] in July of 2013 at [facility]. Mr. Fallon was also treated for testicular cancer in the summer of 2014. Mounika's younger brother has been diagnosed with Crohn's disease at [facility].  There has been no other interval family history.

## 2025-04-03 NOTE — HISTORY OF PRESENT ILLNESS
[FreeTextEntry1] : Mounika was evaluated at the cardiology office at the [facility] on April 1, 2025.  She is now a 16 year-10-month-old young lady who is followed in our division with the diagnosis of Marfan syndrome (genetically confirmed), with a dilated aortic root, and mitral valve prolapse. Her last cardiac evaluation was on July 9, 2024. She had a successful surgical repair of her pectus excavatum by Dr. Rincon on December 13, 2019, as well as successful surgical removal of the chest wall bar on August 10, 2022.  She was accompanied to the office visit today by her mother.   Mounika's father has Marfan's syndrome, with a known molecular genetically determined familial mutation for this syndrome, (see below for the mutation). Mounika shares this same genetic familial mutation with her father.   Mounika is asymptomatic with reference to the cardiovascular system.  She reports no chest pain, palpitations, dizziness, easy fatigability, shortness of breath, or syncope. Mounika has been staying active by walking.  In the past, she has also enjoyed participating on the [employer] team.  She participates in a modified gym program at school.  Her last cardiac MRI/MRA was on June 28, 2023.  She is being treated with losartan 50 mg twice daily, for her dilated aorta, and tolerates this medication without any problems.   In early May, 2022,  Mounika had some increased anxiety.  She was going to the school nurse for migraines and chest wall discomfort.  At those times, the nurse recorded a heart rate of 120 bpm.  For these reasons, a 24-hour Holter monitor was performed on May 16, 2022.  This study was a predominant normal sinus rhythm with normal heart rate variability and occasional to frequent premature atrial contractions.  Since that time, Mounika had been working with a psychiatrist and psychologist.  These interventions appeared to be helpful with regard to addressing Mounika's anxieties/depression. She is being treated with Luvox/ fluvoxamine for depression (selective serotonin reuptake inhibitor). Mounika is no longer complaining of palpitations or chest pain.  Mounika is now on a 5-week wean of Luvox (Fluvoxamine) and has been started on Wellbutrin (Bupropion).  She appears well-nourished and her BMI is quite good at the 55%ile (improved from the 5%ile in July of 2020).  In the past, Mounika was evaluated by Dr. Neil in the adolescent medicine eating disorder clinic.  The family has opted not to return to their care.  However, Mounika has shown marked improvement in her eating habits and continues to gain weight progressively and appropriately.  She was last seen in GI by Dr. Nolan in September of 2023.  Mounika has a history of significant headaches.  Mounika is followed by Dr. Borjas, pediatric neurologist, for her headaches. 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 In September 2019, Mounika had a brain MRI performed which was unremarkable. An MRA of her neck was notable for tortuosities of the internal carotid arteries with no aneurysms, or stenoses. The possibility of a "low pressure CSF" headache was considered and Florinef was started. This did not seem to affect the intensity or frequency of the headaches. Her neurologist discontinued therapy with Florinef. After multiple headache medications had failed, Mounika was started on therapy with gabapentin by Dr. Borjas.   Also, Depakote was added to her treatment for her migraine headaches in September 2020.   In the past, she had a short trial of atenolol 12.5 mg once daily for approximately 1 week. However, Mounika complained of dizziness, lightheadedness and visual changes while taking the atenolol.  These medications have been discontinued.  She is currently being treated with Emgality - Galcanezumab (an injection once a month for her migraines), which has been helpful.  She continues to get recurrent migraines and is treated with Nurtec (rimegepant) for break-through headaches. She is followed by Dr. Borjas, in Ped Neuro. Her last evaluation was on November 26, 2024.  While in school, her headaches are often triggered by bright light and screens such as a "smart board".  Mounika now has 504 accommodations in school, such that Mounika gets her learning information on her personal Ipad instead of from the smart board. Mrs. Fallon says that this also has helped decrease her headaches.   I continued to emphasize the importance of excellent hydration throughout the course of the day, as well as salty snacks, as well as adequate sleep hygiene and daily exercise.  Her pectus excavatum was extremely severe, (Ita index of 9.4). She is s/p surgical repair of her pectus excavatum by Dr. Rincon on December 13, 2019, as well as successful surgical removal of the chest wall bar on August 10, 2022.   In the past, she had been followed in orthopedics at the [facility] by Dr. Cerda for her scoliosis. In April of 2018, Mounika had a subluxation of her patella, which was followed in pediatric orthopedics by Dr. Escobedo. Mounika was evaluated by Dr. Garnica of orthopedics at [facility] in November 2022.  She has scoliosis (28 degrees), and postural kyphosis.  She is nearing skeletal maturity, and her scoliosis is unlikely to progress significantly. She is in need of orthopedic follow-up.   She is in the 11th grade.  She participates in the [employer]ate program in her high school, an accelerated honors program.  She has a 504 in place in school.  She is s/p a palate expander and orthodontic braces. She has no known allergies to medications. Her immunizations are up to date.   Her last ophthalmology visit was in the summer of 2023. She has glasses and contact lenses for myopia (-2.75 diopters OD, -3.5 diopters OS).  She has no evidence of ectopia lentis.  Annual follow-up in ophthalmology is recommended.  Her last dental evaluation was in May of 2024.  A review of systems was otherwise unremarkable.  Mounika's father has many medical problems relating to his Marfan syndrome. In December of 2012, Mr. Fallon underwent a valve sparing aortic root replacement with a Valsalva graft, by Dr. Gerardo, at [facility]. He is followed by cardiology at [facility] for his cardiac issues relating to Marfan syndrome.  Recently, in May 2023, Mr. Fallon developed atrial fibrillation requiring an ablation which was performed at [facility].  He also has severe scoliosis (90 degree curve). He had spine surgery in July of 2013 at [facility] for [facility]. Mr. Fallon was also treated for testicular cancer in the summer of 2014. Mounika's younger brother has been diagnosed with Crohn's disease at [facility].  There has been no other interval family history.